# Patient Record
Sex: FEMALE | Race: WHITE
[De-identification: names, ages, dates, MRNs, and addresses within clinical notes are randomized per-mention and may not be internally consistent; named-entity substitution may affect disease eponyms.]

---

## 2018-01-21 ENCOUNTER — HOSPITAL ENCOUNTER (EMERGENCY)
Age: 23
LOS: 1 days | Discharge: HOME | End: 2018-01-22

## 2018-01-21 ENCOUNTER — HOSPITAL ENCOUNTER (EMERGENCY)
Dept: HOSPITAL 91 - FTE | Age: 23
LOS: 1 days | Discharge: HOME | End: 2018-01-22
Payer: COMMERCIAL

## 2018-01-21 DIAGNOSIS — K21.9: Primary | ICD-10-CM

## 2018-01-21 PROCEDURE — 71045 X-RAY EXAM CHEST 1 VIEW: CPT

## 2018-01-21 PROCEDURE — 93005 ELECTROCARDIOGRAM TRACING: CPT

## 2018-01-21 PROCEDURE — 70360 X-RAY EXAM OF NECK: CPT

## 2018-01-21 PROCEDURE — 99284 EMERGENCY DEPT VISIT MOD MDM: CPT

## 2018-01-22 RX ADMIN — ALUMINUM HYDROXIDE, MAGNESIUM HYDROXIDE, DIMETHICONE 1 ML: 200; 200; 20 SUSPENSION ORAL at 02:21

## 2018-02-22 ENCOUNTER — HOSPITAL ENCOUNTER (OUTPATIENT)
Dept: HOSPITAL 91 - GIL | Age: 23
Discharge: HOME | End: 2018-02-22
Payer: COMMERCIAL

## 2018-02-22 ENCOUNTER — HOSPITAL ENCOUNTER (OUTPATIENT)
Age: 23
Discharge: HOME | End: 2018-02-22

## 2018-02-22 DIAGNOSIS — E66.01: ICD-10-CM

## 2018-02-22 DIAGNOSIS — K29.60: ICD-10-CM

## 2018-02-22 DIAGNOSIS — K21.9: Primary | ICD-10-CM

## 2018-02-22 PROCEDURE — 87081 CULTURE SCREEN ONLY: CPT

## 2018-02-22 PROCEDURE — 84703 CHORIONIC GONADOTROPIN ASSAY: CPT

## 2018-02-22 PROCEDURE — 43239 EGD BIOPSY SINGLE/MULTIPLE: CPT

## 2018-11-23 ENCOUNTER — HOSPITAL ENCOUNTER (EMERGENCY)
Dept: HOSPITAL 91 - FTE | Age: 23
Discharge: HOME | End: 2018-11-23
Payer: SELF-PAY

## 2018-11-23 ENCOUNTER — HOSPITAL ENCOUNTER (EMERGENCY)
Age: 23
Discharge: HOME | End: 2018-11-23

## 2018-11-23 DIAGNOSIS — K29.70: Primary | ICD-10-CM

## 2018-11-23 PROCEDURE — 99283 EMERGENCY DEPT VISIT LOW MDM: CPT

## 2018-11-23 RX ADMIN — ALUMINUM HYDROXIDE, MAGNESIUM HYDROXIDE, DIMETHICONE 1 ML: 200; 200; 20 SUSPENSION ORAL at 19:31

## 2018-11-23 RX ADMIN — PHENOBARBITAL, HYOSCYAMINE SULFATE, ATROPINE SULFATE, SCOPOLAMINE HYDROBROMIDE 1 TAB: 16.2; .1037; .0194; .0065 TABLET ORAL at 19:31

## 2019-07-07 ENCOUNTER — HOSPITAL ENCOUNTER (OUTPATIENT)
Dept: HOSPITAL 10 - OBT | Age: 24
Discharge: HOME | End: 2019-07-07
Attending: SPECIALIST
Payer: COMMERCIAL

## 2019-07-07 ENCOUNTER — HOSPITAL ENCOUNTER (OUTPATIENT)
Dept: HOSPITAL 91 - OBT | Age: 24
Discharge: HOME | End: 2019-07-07
Payer: COMMERCIAL

## 2019-07-07 VITALS
WEIGHT: 210.54 LBS | HEIGHT: 62 IN | WEIGHT: 210.54 LBS | HEIGHT: 62 IN | BODY MASS INDEX: 38.74 KG/M2 | BODY MASS INDEX: 38.74 KG/M2

## 2019-07-07 VITALS — HEART RATE: 80 BPM | DIASTOLIC BLOOD PRESSURE: 54 MMHG | RESPIRATION RATE: 18 BRPM | SYSTOLIC BLOOD PRESSURE: 99 MMHG

## 2019-07-07 DIAGNOSIS — O26.892: Primary | ICD-10-CM

## 2019-07-07 DIAGNOSIS — R35.0: ICD-10-CM

## 2019-07-07 DIAGNOSIS — M54.9: ICD-10-CM

## 2019-07-07 DIAGNOSIS — Z3A.26: ICD-10-CM

## 2019-07-07 LAB
ADD UMIC: YES
UR ASCORBIC ACID: 40 MG/DL
UR BACTERIA: (no result) /HPF
UR BILIRUBIN (DIP): NEGATIVE MG/DL
UR BLOOD (DIP): NEGATIVE MG/DL
UR CLARITY: (no result)
UR COLOR: YELLOW
UR GLUCOSE (DIP): NEGATIVE MG/DL
UR KETONES (DIP): (no result) MG/DL
UR LEUKOCYTE ESTERASE (DIP): (no result) LEU/UL
UR MUCUS: (no result) /HPF
UR NITRITE (DIP): NEGATIVE MG/DL
UR PH (DIP): 7 (ref 5–9)
UR RBC: 4 /HPF (ref 0–5)
UR SPECIFIC GRAVITY (DIP): 1.02 (ref 1–1.03)
UR SQUAMOUS EPITHELIAL CELL: (no result) /HPF
UR TOTAL PROTEIN (DIP): NEGATIVE MG/DL
UR UROBILINOGEN (DIP): NEGATIVE MG/DL
UR WBC: 3 /HPF (ref 0–5)

## 2019-07-07 PROCEDURE — G0463 HOSPITAL OUTPT CLINIC VISIT: HCPCS

## 2019-07-07 PROCEDURE — 87086 URINE CULTURE/COLONY COUNT: CPT

## 2019-07-07 PROCEDURE — 81001 URINALYSIS AUTO W/SCOPE: CPT

## 2019-07-07 NOTE — TRIAGE
===================================

OB Triage

===================================

Datetime Report Generated by CPN: 07/07/2019 14:41

   

   

===========================

Datetime: 07/07/2019 14:25

===========================

   

 Stage of Pregnancy:  OB Triage

   

===========================

Datetime: 07/07/2019 12:50

===========================

   

 Assessment Type:  Triage

   

===================================

Maternal Assessment

===================================

   

 Level of Consciousness:  Keenly Alert, Responsive

 DTR's/Clonus:  DTRs 2+; No Clonus

 Headache:  Denies

 Blurred Vision:  No

 Respiratory Effort:  Unlabored; Regular Rhythm; Equal Expansion

 Breath Sounds, Left:  Clear and Equal

 Breath Sounds, Right:  Clear and Equal

 Nausea/Vomiting:  Denies

 RUQ Epigastric Pain:  Denies

 Lower Extremities Edema:  None

     Degree:  None

 Upper Extremities Edema:  None

     Degree:  None

 Facial Edema:  None

   

===================================

Fall Risk Assessment

===================================

   

 History of Falling:  (0) No

 Secondary Diagnosis:  (0) No

 Ambulatory Aid:  (0) Bedrest/Nurse Assist

 IV Therapy:  (0) No

 Gait:  (0) Normal/Bedrest/Immobile

 Mental Status:  (0) Oriented to Own Ability

 Fall Score:  0

 Fall Risk Score Definition:  No Risk: No action required

   

===========================

Datetime: 07/07/2019 12:48

===========================

   

   

===================================

Labor Evaluation

===================================

   

 Monitor Mode:  External

   

===================================

Fetal Heart Rate

===================================

   

 Monitor Mode:  External US

   

===========================

Datetime: 07/07/2019 12:43

===========================

   

 Time of Arrival:  07/07/2019 12:34

 EGA:  26.0

 Arrived By:  Ambulatory

 Arrived From:  Home

 Chief Complaint:  PT. HERE C/O URINARY FREAQUENCY AND BACK PAIN

 Fetal Movement:  Present

 Contractions:  Irregular

 Rupture of Membranes:  Denies

 Vaginal Bleeding:  None

 Vaginal Discharge:  Denies

 Recent Sexual Intercouse:  Denies

 Abdominal Trauma:  Not Applicable

 Patient Complaints:  Cramping; Back Pain

 Time Provider Notified:  07/07/2019 13:02

 Provider Notified:  MILESTONE

 Initial Plan:  NST/DWAYNE

## 2019-07-22 NOTE — PN
Date/Time of Note


Date/Time of Note


DATE: 7/22/19 


TIME: 18:41





OB Subjective


Subjective


Subjective


Triage date 7/7/2019


Gestational age of 26 weeks


Patient presents with complaints of urinary frequency and back pain


Urinary analysis.


Urine culture pending


NST appropriate for gestational age











MILESTONE,JUSTIN CHAIREZ             Jul 22, 2019 18:42

## 2019-07-25 ENCOUNTER — HOSPITAL ENCOUNTER (OUTPATIENT)
Dept: HOSPITAL 10 - OBT | Age: 24
Discharge: HOME | End: 2019-07-25
Attending: SPECIALIST
Payer: COMMERCIAL

## 2019-07-25 ENCOUNTER — HOSPITAL ENCOUNTER (OUTPATIENT)
Dept: HOSPITAL 91 - OBT | Age: 24
Discharge: HOME | End: 2019-07-25
Payer: COMMERCIAL

## 2019-07-25 VITALS
WEIGHT: 211.64 LBS | HEIGHT: 62 IN | BODY MASS INDEX: 38.95 KG/M2 | BODY MASS INDEX: 38.95 KG/M2 | WEIGHT: 211.64 LBS | HEIGHT: 62 IN

## 2019-07-25 VITALS — DIASTOLIC BLOOD PRESSURE: 55 MMHG | HEART RATE: 84 BPM | SYSTOLIC BLOOD PRESSURE: 112 MMHG | RESPIRATION RATE: 18 BRPM

## 2019-07-25 DIAGNOSIS — O41.92X0: Primary | ICD-10-CM

## 2019-07-25 DIAGNOSIS — Z3A.28: ICD-10-CM

## 2019-07-25 LAB
ADD UMIC: YES
RUPTURE FETAL MEMBRANES: NEGATIVE
UR ASCORBIC ACID: NEGATIVE MG/DL
UR BACTERIA: (no result) /HPF
UR BILIRUBIN (DIP): NEGATIVE MG/DL
UR BLOOD (DIP): NEGATIVE MG/DL
UR CLARITY: (no result)
UR COLOR: YELLOW
UR GLUCOSE (DIP): NEGATIVE MG/DL
UR KETONES (DIP): NEGATIVE MG/DL
UR LEUKOCYTE ESTERASE (DIP): (no result) LEU/UL
UR NITRITE (DIP): NEGATIVE MG/DL
UR PH (DIP): 7 (ref 5–9)
UR RBC: 0 /HPF (ref 0–5)
UR SPECIFIC GRAVITY (DIP): 1.01 (ref 1–1.03)
UR SQUAMOUS EPITHELIAL CELL: (no result) /HPF
UR TOTAL PROTEIN (DIP): NEGATIVE MG/DL
UR UROBILINOGEN (DIP): NEGATIVE MG/DL
UR WBC: 2 /HPF (ref 0–5)

## 2019-07-25 PROCEDURE — 76815 OB US LIMITED FETUS(S): CPT

## 2019-07-25 PROCEDURE — 81001 URINALYSIS AUTO W/SCOPE: CPT

## 2019-07-25 PROCEDURE — 84112 EVAL AMNIOTIC FLUID PROTEIN: CPT

## 2019-07-25 PROCEDURE — G0463 HOSPITAL OUTPT CLINIC VISIT: HCPCS

## 2019-07-25 NOTE — HP
Date/Time of Note


Date/Time of Note


DATE: 19 


TIME: 23:49





OB - History


Hx of Present Pregnancy


Free Text/Dictation


23-year-old  3 para 1 with pregnancy at 28 weeks and 4 days.  Estimated 


due date 2019.  She reports to OB triage area with chief complaint 


of loss of fluid per vagina.  She reports she noticed involuntary loss of urine 


last night.  She was convinced that the fluid is urine because it smelled like 


urine and it looks like urine.  She has a long history of pattern with UTI 


throughout this pregnancy.  She reports that she was placed on Macrobid for 1 


months earlier in the pregnancy.  She also complaining of frequency more than 


before since yesterday.  She reports today she had less loss of fluid per vagina


and she believes that all the loss of fluid per vagina or the urine.  She came 


to hospital to make sure she does not have premature rupture of membrane.  She 


denies vaginal bleeding.  She denies uterine contractions.  She reports good 


fetal movements.  She denies nausea vomiting fevers or chills.


On speculum examination pooling is negative, nitrazine is negative, ROM plus 


return negative.


Prenatal Care:  Good Care


Ultrasounds:  Normal mid trimester US


Obstetrical Complications:  None


Medical Complications:  None





Past Family/Social History


*


Past Medical, Surgical, Family and Obstetric Histories reviewed from prenatal 


chart.





OB  Admission Exam


Vital Signs


Vital Signs





Vital Signs


  Date      Temp  Pulse  Resp  B/P (MAP)   Pulse Ox  O2          O2 Flow    FiO2


Time                                                 Delivery    Rate


   19  98.8     84    18      112/55


     18:43                           (74)








Physical Exam


HEENT:  WNL


Heart:  Rhythm Normal


Lungs:  Clear, Equal


Abdomen:  WNL


Extremities:  Normal


Reflexes:  Normal


Cervical Dilatation:  None


Effacement:  0%


Station:  [place]


Membranes:  Intact





OB  Assessment/Plan


Other Assessment:


Pregnancy at 28 weeks and 4 days.


Urinary tract infection based on 2+ leukocytes on UA.


No evidence of premature  rupture of membrane based on the examination 


negative nitrazine and negative ROM plus.


Other plan:


For abundance of precaution patient is requested to return in 2 days for NST CHUCK


although the CHUCK is normal today.


Keflex 500 mg every 6 hours for 10 days #40











MAX ENNIS MD            2019 23:54

## 2019-07-26 NOTE — TRIAGE
===================================

OB Triage

===================================

Datetime Report Generated by CPN: 07/26/2019 04:54

   

   

===========================

Datetime: 07/25/2019 22:49

===========================

   

 Stage of Pregnancy:  OB Triage

   

===================================

Labor Evaluation

===================================

   

 Frequency:  Occasional

 Monitor Mode:  External

 Duration (sec)2399:  40

 Quality:  Mild

 Pattern:  Normal: <= 5 Contractions in 10 Minutes

 Resting Tone Flowe[place]:  Relaxed

   

===================================

Fetal Heart Rate

===================================

   

 FHR Baseline Rate:  135

 Monitor Mode:  External US

 FHR Baseline Changes:  No Baseline Change

 Variability:  Moderate 6-25 bpm

 Accelerations:  15X15

   

===========================

Datetime: 07/25/2019 22:44

===========================

   

 Stage of Pregnancy:  OB Triage

   

===========================

Datetime: 07/25/2019 22:00

===========================

   

 Stage of Pregnancy:  OB Triage

   

===================================

Labor Evaluation

===================================

   

 Frequency:  Occasional

 Monitor Mode:  External

 Duration (sec)2399:  50-60

 Quality:  Mild

 Pattern:  Normal: <= 5 Contractions in 10 Minutes

 Resting Tone Floweree:  Relaxed

   

===================================

Fetal Heart Rate

===================================

   

 FHR Baseline Rate:  135

 Monitor Mode:  External US

 Variability:  Moderate 6-25 bpm

 Accelerations:  15X15

   

===========================

Datetime: 07/25/2019 21:25

===========================

   

 Stage of Pregnancy:  OB Triage

   

===========================

Datetime: 07/25/2019 21:00

===========================

   

 Stage of Pregnancy:  OB Triage

   

===================================

Labor Evaluation

===================================

   

 Frequency:  Occasional

 Monitor Mode:  External

 Duration (sec)2399:  40-60

 Quality:  Mild

 Pattern:  Normal: <= 5 Contractions in 10 Minutes

 Resting Tone Floweree:  Relaxed

   

===================================

Fetal Heart Rate

===================================

   

 FHR Baseline Rate:  140

 Monitor Mode:  External US

 FHR Baseline Changes:  No Baseline Change

 Variability:  Moderate 6-25 bpm

 Accelerations:  15X15

   

===========================

Datetime: 07/25/2019 20:50

===========================

   

 Stage of Pregnancy:  OB Triage

   

===================================

Pain Assessment

===================================

   

 Pain Scale:  0

 Pain Presence:  None/Denies

 Pain Type:  N/A

   

===========================

Datetime: 07/25/2019 20:00

===========================

   

 Stage of Pregnancy:  OB Triage

   

===================================

Labor Evaluation

===================================

   

 Frequency:  Irritability x4

 Monitor Mode:  External

 Duration (sec)2399:  20-30

 Quality:  Mild

 Pattern:  Normal: <= 5 Contractions in 10 Minutes

 Resting Tone Floweree:  Relaxed

   

===================================

Fetal Heart Rate

===================================

   

 FHR Baseline Rate:  140

 Monitor Mode:  External US

 Variability:  Moderate 6-25 bpm

 Accelerations:  10X10

   

===========================

Datetime: 07/25/2019 19:48

===========================

   

 Stage of Pregnancy:  OB Triage

   

===========================

Datetime: 07/25/2019 19:21

===========================

   

 Stage of Pregnancy:  OB Triage

 Assessment Type:  Triage

   

===================================

Maternal Assessment

===================================

   

 Level of Consciousness:  Keenly Alert, Responsive

 DTR's/Clonus:  DTRs 2+; No Clonus

 Headache:  Denies

 Blurred Vision:  No

 Respiratory Effort:  Unlabored; Regular Rhythm; Equal Expansion

 Breath Sounds, Left:  Clear and Equal

 Breath Sounds, Right:  Clear and Equal

 Nausea/Vomiting:  Denies

 RUQ Epigastric Pain:  Denies

 Lower Extremities Edema:  None

     Degree:  None

 Upper Extremities Edema:  None

     Degree:  None

 Facial Edema:  None

 Temperature Route:  Oral

   

===================================

Fall Risk Assessment

===================================

   

 History of Falling:  (0) No

 Secondary Diagnosis:  (0) No

 Ambulatory Aid:  (0) Bedrest/Nurse Assist

 IV Therapy:  (0) No

 Gait:  (0) Normal/Bedrest/Immobile

 Mental Status:  (0) Oriented to Own Ability

 Fall Score:  0

 Fall Risk Score Definition:  No Risk: No action required

   

===================================

Pain Assessment

===================================

   

 Pain Scale:  0

 Pain Presence:  None/Denies

 Pain Type:  N/A

   

===========================

Datetime: 07/25/2019 18:53

===========================

   

 Stage of Pregnancy:  OB Triage

 Assessment Type:  Triage

   

===================================

Maternal Assessment

===================================

   

 Level of Consciousness:  Keenly Alert, Responsive

 DTR's/Clonus:  DTRs 2+; No Clonus

 Headache:  Denies

 Blurred Vision:  No

 Respiratory Effort:  Unlabored; Regular Rhythm; Equal Expansion

 Breath Sounds, Left:  Clear and Equal

 Breath Sounds, Right:  Clear and Equal

 Nausea/Vomiting:  Denies

 RUQ Epigastric Pain:  Denies

 Lower Extremities Edema:  None

     Degree:  None

 Upper Extremities Edema:  None

     Degree:  None

 Facial Edema:  None

 Temperature Route:  Oral

   

===================================

Fall Risk Assessment

===================================

   

 History of Falling:  (0) No

 Secondary Diagnosis:  (0) No

 Ambulatory Aid:  (0) Bedrest/Nurse Assist

 IV Therapy:  (0) No

 Gait:  (0) Normal/Bedrest/Immobile

 Mental Status:  (0) Oriented to Own Ability

 Fall Score:  0

 Fall Risk Score Definition:  No Risk: No action required

   

===================================

Labor Evaluation

===================================

   

 Frequency:  x3 

 Monitor Mode:  External

 Duration (sec)2399:  40

 Quality:  Mild

 Resting Tone Floweree:  Relaxed

   

===================================

Fetal Heart Rate

===================================

   

 FHR Baseline Rate:  150

 Monitor Mode:  External US

 FHR Baseline Changes:  No Baseline Change

 Variability:  Moderate 6-25 bpm

 Decelerations:  None

 Category:  Category I

   

===================================

Pain Assessment

===================================

   

 Pain Scale:  0

 Pain Presence:  None/Denies

 Pain Type:  N/A

   

===========================

Datetime: 07/25/2019 18:51

===========================

   

 Time of Arrival:  07/25/2019 17:54

 EGA:  28.4

 Arrived By:  Ambulatory

 Arrived From:  Home

 Chief Complaint:  leaking from 07/24 2000

 Fetal Movement:  Present

 Contractions:  Denies/Absent

 Rupture of Membranes:  Unsure

 Vaginal Bleeding:  None

 Vaginal Discharge:  Denies

 Recent Sexual Intercouse:  Denies

 Abdominal Trauma:  Not Applicable

 Patient Complaints:  None

 Time Provider Notified:  07/25/2019 19:12

 Provider Notified:  Dr Carmen

 Initial Plan:  NST

## 2019-07-27 ENCOUNTER — HOSPITAL ENCOUNTER (OUTPATIENT)
Dept: HOSPITAL 10 - OBT | Age: 24
Discharge: HOME | End: 2019-07-27
Attending: SPECIALIST
Payer: COMMERCIAL

## 2019-07-27 ENCOUNTER — HOSPITAL ENCOUNTER (OUTPATIENT)
Dept: HOSPITAL 91 - OBT | Age: 24
Discharge: HOME | End: 2019-07-27
Payer: COMMERCIAL

## 2019-07-27 VITALS
WEIGHT: 211.86 LBS | WEIGHT: 211.86 LBS | HEIGHT: 62 IN | BODY MASS INDEX: 38.99 KG/M2 | BODY MASS INDEX: 38.99 KG/M2 | HEIGHT: 62 IN

## 2019-07-27 VITALS — RESPIRATION RATE: 18 BRPM | SYSTOLIC BLOOD PRESSURE: 98 MMHG | DIASTOLIC BLOOD PRESSURE: 55 MMHG | HEART RATE: 81 BPM

## 2019-07-27 DIAGNOSIS — Z3A.28: ICD-10-CM

## 2019-07-27 DIAGNOSIS — O41.92X0: Primary | ICD-10-CM

## 2019-07-27 LAB
ADD UMIC: YES
RUPTURE FETAL MEMBRANES: NEGATIVE
UR ASCORBIC ACID: 40 MG/DL
UR BACTERIA: (no result) /HPF
UR BILIRUBIN (DIP): NEGATIVE MG/DL
UR BLOOD (DIP): NEGATIVE MG/DL
UR CLARITY: (no result)
UR COLOR: YELLOW
UR GLUCOSE (DIP): NEGATIVE MG/DL
UR KETONES (DIP): (no result) MG/DL
UR LEUKOCYTE ESTERASE (DIP): (no result) LEU/UL
UR NITRITE (DIP): NEGATIVE MG/DL
UR PH (DIP): 6 (ref 5–9)
UR RBC: 2 /HPF (ref 0–5)
UR SPECIFIC GRAVITY (DIP): 1.02 (ref 1–1.03)
UR SQUAMOUS EPITHELIAL CELL: (no result) /HPF
UR TOTAL PROTEIN (DIP): NEGATIVE MG/DL
UR UROBILINOGEN (DIP): (no result) MG/DL
UR WBC: 2 /HPF (ref 0–5)

## 2019-07-27 PROCEDURE — 84112 EVAL AMNIOTIC FLUID PROTEIN: CPT

## 2019-07-27 PROCEDURE — 76818 FETAL BIOPHYS PROFILE W/NST: CPT

## 2019-07-27 PROCEDURE — 81001 URINALYSIS AUTO W/SCOPE: CPT

## 2019-07-27 PROCEDURE — G0463 HOSPITAL OUTPT CLINIC VISIT: HCPCS

## 2019-07-27 NOTE — TRIAGE
===================================

OB Triage

===================================

Datetime Report Generated by CPN: 07/27/2019 14:56

   

   

===========================

Datetime: 07/27/2019 14:00

===========================

   

 Stage of Pregnancy:  OB Triage

 Level of Consciousness:  Keenly Alert, Responsive

 Frequency:  OCCASIONAL

 Monitor Mode:  External

 Duration (sec)2399:  60-70

 Quality:  Mild

 Resting Tone [place]:  Relaxed

 FHR Baseline Rate:  140

 Monitor Mode:  External US

 Variability:  Moderate 6-25 bpm

 Accelerations:  15X15

 Decelerations:  None

 Category:  Category I

 Pain Scale:  0

 Pain Goal:  3

 Membrane Status:  Intact

 Vaginal Bleeding:  None

   

===========================

Datetime: 07/27/2019 13:25

===========================

   

 Pool:  Negative

   

===========================

Datetime: 07/27/2019 13:21

===========================

   

 Assessment Type:  Triage

 Level of Consciousness:  Keenly Alert, Responsive

 DTR's/Clonus:  DTRs 2+; No Clonus

 Headache:  Denies

 Blurred Vision:  No

 Respiratory Effort:  Unlabored; Regular Rhythm; Equal Expansion

 Breath Sounds, Left:  Clear and Equal

 Breath Sounds, Right:  Clear and Equal

 Nausea/Vomiting:  Denies

 RUQ Epigastric Pain:  Denies

 Lower Extremities Edema:  None

     Degree:  None

 Upper Extremities Edema:  None

     Degree:  None

 Facial Edema:  None

 History of Falling:  (0) No

 Secondary Diagnosis:  (0) No

 Ambulatory Aid:  (0) Bedrest/Nurse Assist

 IV Therapy:  (0) No

 Gait:  (0) Normal/Bedrest/Immobile

 Mental Status:  (0) Oriented to Own Ability

 Fall Score:  0

 Fall Risk Score Definition:  No Risk: No action required

   

===========================

Datetime: 07/27/2019 13:19

===========================

   

 Time of Arrival:  07/27/2019 12:24

 EGA:  28.6

 Arrived By:  Ambulatory

 Arrived From:  Home

 Chief Complaint:  PT. HERE FOR NST/BPP FOR H/O ROM PLUS POSITVE.

 Fetal Movement:  Present

 Contractions:  Denies/Absent

 Rupture of Membranes:  Unsure

 Vaginal Bleeding:  None

 Vaginal Discharge:  Present

 Recent Sexual Intercouse:  Denies

 Abdominal Trauma:  Not Applicable

 Patient Complaints:  None

 Time Provider Notified:  07/27/2019 13:05

 Provider Notified:  FAZILAT

 Initial Plan:  NST/BPP/ROM PLUS/UA

   

===========================

Datetime: 07/27/2019 13:10

===========================

   

 Monitor Mode:  External

 Monitor Mode:  External US

   

===========================

Datetime: 07/25/2019 22:49

===========================

   

 Pain Scale:  0

 Pain Presence:  None/Denies

 Pain Type:  N/A

 Pain Assessment Comments:  Pt continues to deny pain or cramping

   

===========================

Datetime: 07/25/2019 22:48

===========================

   

 Stage of Pregnancy:  OB Triage

   

===========================

Datetime: 07/25/2019 22:31

===========================

   

 Stage of Pregnancy:  OB Triage

   

===========================

Datetime: 07/25/2019 22:29

===========================

   

 Stage of Pregnancy:  OB Triage

   

===========================

Datetime: 07/25/2019 21:41

===========================

   

 Stage of Pregnancy:  OB Triage

   

===========================

Datetime: 07/25/2019 20:50

===========================

   

 Pain Assessment Comments:  Pt continues to deny any pain or cramping at this time

   

===========================

Datetime: 07/25/2019 20:09

===========================

   

 Stage of Pregnancy:  OB Triage

   

===========================

Datetime: 07/25/2019 19:50

===========================

   

 Stage of Pregnancy:  OB Triage

## 2019-07-29 NOTE — PN
Triage Information


Date/Time


late entry note for 2019


Reason for visit:  NST and BPP


Weeks of Gestation


 at 28+ wks ga KALPANA 10/7/2019 here for NST and BPP for hx of questionable  


+ROM





patient reports positive fetal movement, denies uterine contractions, denies 


vaginal bleeding or leaking fluid








patient is currently on keflex for UTI





/Para





Diabetes:  none


Hypertention:  none





Objective





Vital Signs


  Date      Temp  Pulse  Resp  B/P (MAP)   Pulse Ox  O2          O2 Flow    FiO2


Time                                                 Delivery    Rate


   19  98.2     81    18  98/55 (69)            Room Air


     13:16





Fetal Heart Rate:  140's


Fetal Heart Rate Comments


fhr tracing cat 1


Contractions:  None





Results/Medications


Results 24 hrs


ROM negative 











Urine Results - 72 Hrs


          Test
                                         19
13:30


          Urine Color
                     YELLOW
(YELLOW)


          Urine Clarity
                   SLIGHTLY
CLOUDY (CLEAR)


          Urine pH                                    6.0 (5.0-9.0)


          Urine Specific Gravity
                 1.019
(1.003-1.030)


          Urine Ketones
                       1+ mg/dL
(NEGATIVE)  H


          Urine Nitrite
                   NEGATIVE mg/dL
(NEGATIVE)


          Urine Bilirubin
                 NEGATIVE mg/dL
(NEGATIVE)


          Urine Urobilinogen
                  1+ mg/dL
(NEGATIVE)  H


          Urine Leukocyte Esterase
        TRACE [place]/ul
(NEGATIVE)  A


          Urine Microscopic RBC                        2 /HPF (0-5)


          Urine Microscopic WBC                        2 /HPF (0-5)


          Urine Squamous Epithelial
Cells  FEW /HPF (FEW)



          Urine Bacteria
                  FEW /HPF (NONE
SEEN)  A


          Urine Hemoglobin
                NEGATIVE mg/dL
(NEGATIVE)


          Urine Glucose
                   NEGATIVE mg/dL
(NEGATIVE)


          Urine Total Protein
             NEGATIVE mg/dl
(NEGATIVE)





Imaging Results


                                        


PROCEDURE:   OB ultrasound for biophysical profile. 


 


CLINICAL INDICATION:   Low amniotic fluid index. 


 


TECHNIQUE:   Multiple sonographic images of the pelvis were obtained using 


transabdominal approach.  


 


COMPARISON: Obstetrical sonogram dated 2019.


 


FINDINGS:


 


Fetal breathing movement = 2/2


Fetal tone = 2/2


Fetal motion = 2/2


 


CHUCK = 16.4 cm


Single living intrauterine gestation with fetal heart rate of 137 beats per 


minute.  


Anterior grade II placenta.


Cephalic presentation.


 


IMPRESSION:


 


1.  Single living intrauterine gestation with cephalic presentation.  


2.  Biophysical profile = 8/8.


3.  CHUCK = 16.4 cm. 


 


 


 


 


 


 


 


 


RPTAT:HAJM


_____________________________________________ 


Zachariah Lucero Physician           Date    Time 


Electronically viewed and signed by Zachariah Lucero Physician on 2019 


14:33 


 


D:  2019 14:33  T:  2019 14:33


RM/





CC: MAX ENNIS MD





[phone]


Disposition:  Discharge


Assessment/Plan


fetal kick count instructions were given


labor precautions were given


patient instructed to f/u with obgyn clinic in 1-2 days











PRINCESS ARTEAGA MD             2019 10:14

## 2019-08-07 ENCOUNTER — HOSPITAL ENCOUNTER (OUTPATIENT)
Dept: HOSPITAL 10 - OBT | Age: 24
Discharge: HOME | End: 2019-08-07
Attending: SPECIALIST
Payer: COMMERCIAL

## 2019-08-07 ENCOUNTER — HOSPITAL ENCOUNTER (OUTPATIENT)
Dept: HOSPITAL 91 - OBT | Age: 24
Discharge: HOME | End: 2019-08-07
Payer: COMMERCIAL

## 2019-08-07 VITALS
BODY MASS INDEX: 39.27 KG/M2 | HEIGHT: 62 IN | WEIGHT: 213.41 LBS | WEIGHT: 213.41 LBS | BODY MASS INDEX: 39.27 KG/M2 | HEIGHT: 62 IN

## 2019-08-07 VITALS — HEART RATE: 62 BPM

## 2019-08-07 DIAGNOSIS — O60.03: Primary | ICD-10-CM

## 2019-08-07 DIAGNOSIS — Z3A.31: ICD-10-CM

## 2019-08-07 LAB
ADD UMIC: NO
UR ASCORBIC ACID: NEGATIVE MG/DL
UR BILIRUBIN (DIP): NEGATIVE MG/DL
UR BLOOD (DIP): NEGATIVE MG/DL
UR CLARITY: CLEAR
UR COLOR: (no result)
UR GLUCOSE (DIP): NEGATIVE MG/DL
UR KETONES (DIP): NEGATIVE MG/DL
UR LEUKOCYTE ESTERASE (DIP): NEGATIVE LEU/UL
UR NITRITE (DIP): NEGATIVE MG/DL
UR PH (DIP): 8 (ref 5–9)
UR SPECIFIC GRAVITY (DIP): 1 (ref 1–1.03)
UR TOTAL PROTEIN (DIP): NEGATIVE MG/DL
UR UROBILINOGEN (DIP): NEGATIVE MG/DL

## 2019-08-07 PROCEDURE — 76818 FETAL BIOPHYS PROFILE W/NST: CPT

## 2019-08-07 PROCEDURE — 81003 URINALYSIS AUTO W/O SCOPE: CPT

## 2019-08-07 PROCEDURE — 76817 TRANSVAGINAL US OBSTETRIC: CPT

## 2019-08-07 PROCEDURE — 76815 OB US LIMITED FETUS(S): CPT

## 2019-08-07 PROCEDURE — G0463 HOSPITAL OUTPT CLINIC VISIT: HCPCS

## 2019-08-07 NOTE — TRIAGE
===================================

OB Triage

===================================

Datetime Report Generated by CPN: 08/07/2019 23:10

   

   

===========================

Datetime: 08/07/2019 19:35

===========================

   

 Assessment Type:  Triage

   

===================================

Maternal Assessment

===================================

   

 Level of Consciousness:  Keenly Alert, Responsive

 DTR's/Clonus:  DTRs 2+; No Clonus

 Headache:  Denies

 Blurred Vision:  No

 Respiratory Effort:  Unlabored; Regular Rhythm; Equal Expansion

 Breath Sounds, Left:  Clear and Equal

 Breath Sounds, Right:  Clear and Equal

 Nausea/Vomiting:  Denies

 RUQ Epigastric Pain:  Denies

 Facial Edema:  None

   

===================================

Fall Risk Assessment

===================================

   

 History of Falling:  (0) No

 Secondary Diagnosis:  (0) No

 Ambulatory Aid:  (0) Bedrest/Nurse Assist

 IV Therapy:  (0) No

 Gait:  (0) Normal/Bedrest/Immobile

 Mental Status:  (0) Oriented to Own Ability

 Fall Score:  0

 Fall Risk Score Definition:  No Risk: No action required

   

===========================

Datetime: 08/07/2019 18:42

===========================

   

   

===================================

Labor Evaluation

===================================

   

 Frequency:  2 - 3, irregular

 Monitor Mode:  External

 Quality:  Mild

 Resting Tone [place]:  Relaxed

   

===================================

Fetal Heart Rate

===================================

   

 FHR Baseline Rate:  130

 Monitor Mode:  External US

 Variability:  Moderate 6-25 bpm

 Accelerations:  15X15

 Category:  Category I

   

===========================

Datetime: 08/07/2019 17:03

===========================

   

   

===================================

Vaginal Exam

===================================

   

 Dilatation (cms):  0.0

 Station:  -3

 Exam By:  S. Chait

 Vaginal Bleeding:  None

 Cervix, Consistency:  Moderate

 Cervix, Position:  Posterior

 Fetal Presentation 'A':  Unable to Assess

   

===========================

Datetime: 08/07/2019 16:05

===========================

   

   

===================================

Labor Evaluation

===================================

   

 Frequency:  3-5

 Monitor Mode:  External

 Duration (sec)2399:  50

 Quality:  Mild

 Resting Tone [place]:  Relaxed

   

===================================

Fetal Heart Rate

===================================

   

 FHR Baseline Rate:  135

 Monitor Mode:  External US

 Accelerations:  15X15

 Decelerations:  None

 Category:  Category I

   

===========================

Datetime: 08/07/2019 15:46

===========================

   

 Assessment Type:  Triage

   

===================================

Maternal Assessment

===================================

   

 Level of Consciousness:  Keenly Alert, Responsive

 DTR's/Clonus:  DTRs 2+; No Clonus

 Headache:  Denies

 Blurred Vision:  No

 Respiratory Effort:  Unlabored; Regular Rhythm; Equal Expansion

 Breath Sounds, Left:  Clear and Equal

 Breath Sounds, Right:  Clear and Equal

 Nausea/Vomiting:  Denies

 RUQ Epigastric Pain:  Denies

 Lower Extremities Edema:  None

     Degree:  None

 Upper Extremities Edema:  None

     Degree:  None

 Facial Edema:  None

   

===================================

Fall Risk Assessment

===================================

   

 History of Falling:  (0) No

 Secondary Diagnosis:  (0) No

 Ambulatory Aid:  (0) Bedrest/Nurse Assist

 IV Therapy:  (0) No

 Gait:  (0) Normal/Bedrest/Immobile

 Mental Status:  (0) Oriented to Own Ability

 Fall Score:  0

 Fall Risk Score Definition:  No Risk: No action required

   

===========================

Datetime: 08/07/2019 15:43

===========================

   

 Time of Arrival:  08/07/2019 14:30

 EGA:  31.2

 Arrived By:  Ambulatory

 Arrived From:  Home

 Chief Complaint:  abd tightenning with low back pain, and occasional sharp upper abd pain, bilateral
.

 Fetal Movement:  Present

 Contractions:  Irregular

 Rupture of Membranes:  Denies

 Vaginal Bleeding:  None

 Vaginal Discharge:  Denies

 Recent Sexual Intercouse:  Denies

 Abdominal Trauma:  Not Applicable

 Patient Complaints:  Contractions

   

===========================

Datetime: 07/27/2019 13:21

===========================

   

 Fall Score:  0

 Fall Risk Score Definition:  No Risk: No action required

   

===========================

Datetime: 07/27/2019 13:19

===========================

   

 EGA:  29.5

   

===========================

Datetime: 07/25/2019 19:21

===========================

   

 Fall Score:  0

 Fall Risk Score Definition:  No Risk: No action required

   

===========================

Datetime: 07/25/2019 18:53

===========================

   

 Fall Score:  0

 Fall Risk Score Definition:  No Risk: No action required

   

===========================

Datetime: 07/25/2019 18:51

===========================

   

 EGA:  29.3

## 2019-08-07 NOTE — HP
Date/Time of Note


Date/Time of Note


DATE: 19 


TIME: 20:30





OB - History


Hx of Present Pregnancy


Free Text/Dictation


23-year-old  3 para 1 with pregnancy at 31 weeks and 2 days with due date


2019 who presented to OB triage area complaining of premature cramps


and contractions.  After oral hydration and resting she feels much better and 


her contractions resolved.  Cervical length was 3.2 cm.  On examination cervix 


is long closed posterior.  She denies vaginal bleeding loss of fluid per vagina.


 She denies nausea vomiting fevers or chills.  She reports good fetal movements.


She had a previous  around 36 weeks of pregnancy.


Prenatal Care:  [facility]


Ultrasounds:  Normal mid trimester US


Obstetrical Complications:  None


Medical Complications:  None





Past Family/Social History


*


Past Medical, Surgical, Family and Obstetric Histories reviewed from prenatal 


chart.





OB  Admission Exam


Vital Signs


Vital Signs





Vital Signs


  Date      Temp  Pulse  Resp  B/P (MAP)  Pulse Ox  O2          O2 Flow     FiO2


Time                                                Delivery    Rate


    19  98.1     62


     15:38








Physical Exam


HEENT:  WNL


Heart:  Rhythm Normal


Lungs:  Clear, Equal


Abdomen:  WNL


Extremities:  Normal


Reflexes:  Normal


Cervical Dilatation:  None


Effacement:  0%





OB  Assessment/Plan


Other Assessment:


Pregnancy at 31 weeks and 2 days


 labor resolved


History of previous  delivery


Other plan:


Patient will be discharged home with  labor precautions and pelvic rest











MAX ENNIS MD             Aug 7, 2019 20:32

## 2019-09-02 ENCOUNTER — HOSPITAL ENCOUNTER (OUTPATIENT)
Dept: HOSPITAL 10 - OBT | Age: 24
LOS: 1 days | Discharge: HOME | End: 2019-09-03
Attending: SPECIALIST
Payer: COMMERCIAL

## 2019-09-02 ENCOUNTER — HOSPITAL ENCOUNTER (OUTPATIENT)
Dept: HOSPITAL 91 - OBT | Age: 24
LOS: 1 days | Discharge: HOME | End: 2019-09-03
Payer: COMMERCIAL

## 2019-09-02 VITALS
BODY MASS INDEX: 41.66 KG/M2 | HEIGHT: 61 IN | BODY MASS INDEX: 41.66 KG/M2 | WEIGHT: 220.68 LBS | WEIGHT: 220.68 LBS | HEIGHT: 61 IN

## 2019-09-02 VITALS — RESPIRATION RATE: 18 BRPM | DIASTOLIC BLOOD PRESSURE: 58 MMHG | SYSTOLIC BLOOD PRESSURE: 103 MMHG | HEART RATE: 69 BPM

## 2019-09-02 DIAGNOSIS — R51: ICD-10-CM

## 2019-09-02 DIAGNOSIS — Z3A.35: ICD-10-CM

## 2019-09-02 DIAGNOSIS — H53.8: ICD-10-CM

## 2019-09-02 DIAGNOSIS — O26.893: Primary | ICD-10-CM

## 2019-09-02 PROCEDURE — G0463 HOSPITAL OUTPT CLINIC VISIT: HCPCS

## 2019-09-13 ENCOUNTER — HOSPITAL ENCOUNTER (OUTPATIENT)
Dept: HOSPITAL 91 - OBT | Age: 24
LOS: 1 days | Discharge: HOME | End: 2019-09-14
Payer: COMMERCIAL

## 2019-09-13 ENCOUNTER — HOSPITAL ENCOUNTER (OUTPATIENT)
Dept: HOSPITAL 10 - OBT | Age: 24
LOS: 1 days | Discharge: HOME | End: 2019-09-14
Attending: SPECIALIST
Payer: COMMERCIAL

## 2019-09-13 VITALS — HEART RATE: 73 BPM | DIASTOLIC BLOOD PRESSURE: 61 MMHG | RESPIRATION RATE: 18 BRPM | SYSTOLIC BLOOD PRESSURE: 109 MMHG

## 2019-09-13 VITALS
BODY MASS INDEX: 40.41 KG/M2 | WEIGHT: 219.58 LBS | HEIGHT: 62 IN | BODY MASS INDEX: 40.41 KG/M2 | WEIGHT: 219.58 LBS | HEIGHT: 62 IN

## 2019-09-13 DIAGNOSIS — Z3A.36: ICD-10-CM

## 2019-09-13 PROCEDURE — G0463 HOSPITAL OUTPT CLINIC VISIT: HCPCS

## 2019-09-13 PROCEDURE — 76818 FETAL BIOPHYS PROFILE W/NST: CPT

## 2019-09-13 PROCEDURE — 96361 HYDRATE IV INFUSION ADD-ON: CPT

## 2019-09-13 PROCEDURE — 96360 HYDRATION IV INFUSION INIT: CPT

## 2019-09-13 RX ADMIN — TERBUTALINE SULFATE 1 MG: 1 INJECTION SUBCUTANEOUS at 23:01

## 2019-09-13 RX ADMIN — PYRIDOXINE HYDROCHLORIDE 1 MLS/HR: 100 INJECTION, SOLUTION INTRAMUSCULAR; INTRAVENOUS at 19:54

## 2019-09-20 ENCOUNTER — HOSPITAL ENCOUNTER (OUTPATIENT)
Dept: HOSPITAL 10 - L-D | Age: 24
Discharge: HOME | End: 2019-09-20
Attending: SPECIALIST
Payer: COMMERCIAL

## 2019-09-20 ENCOUNTER — HOSPITAL ENCOUNTER (OUTPATIENT)
Dept: HOSPITAL 91 - OBT | Age: 24
Discharge: HOME | End: 2019-09-20
Payer: COMMERCIAL

## 2019-09-20 DIAGNOSIS — Z3A.37: ICD-10-CM

## 2019-09-20 DIAGNOSIS — O26.893: Primary | ICD-10-CM

## 2019-09-20 DIAGNOSIS — M79.651: ICD-10-CM

## 2019-09-20 PROCEDURE — 93971 EXTREMITY STUDY: CPT

## 2019-09-20 PROCEDURE — G0463 HOSPITAL OUTPT CLINIC VISIT: HCPCS

## 2019-10-30 ENCOUNTER — HOSPITAL ENCOUNTER (EMERGENCY)
Dept: HOSPITAL 10 - FTE | Age: 24
Discharge: HOME | End: 2019-10-30
Payer: COMMERCIAL

## 2019-10-30 VITALS
HEIGHT: 62 IN | HEIGHT: 62 IN | BODY MASS INDEX: 36.88 KG/M2 | WEIGHT: 200.4 LBS | WEIGHT: 200.4 LBS | BODY MASS INDEX: 36.88 KG/M2

## 2019-10-30 VITALS — HEART RATE: 57 BPM | DIASTOLIC BLOOD PRESSURE: 67 MMHG | SYSTOLIC BLOOD PRESSURE: 104 MMHG | RESPIRATION RATE: 20 BRPM

## 2019-10-30 DIAGNOSIS — R10.2: Primary | ICD-10-CM

## 2019-10-30 PROCEDURE — 87086 URINE CULTURE/COLONY COUNT: CPT

## 2019-10-30 PROCEDURE — 80053 COMPREHEN METABOLIC PANEL: CPT

## 2019-10-30 PROCEDURE — 81001 URINALYSIS AUTO W/SCOPE: CPT

## 2019-10-30 PROCEDURE — 96360 HYDRATION IV INFUSION INIT: CPT

## 2019-10-30 PROCEDURE — 85025 COMPLETE CBC W/AUTO DIFF WBC: CPT

## 2019-10-30 PROCEDURE — 74177 CT ABD & PELVIS W/CONTRAST: CPT

## 2019-10-30 PROCEDURE — 36415 COLL VENOUS BLD VENIPUNCTURE: CPT

## 2019-10-30 PROCEDURE — 81025 URINE PREGNANCY TEST: CPT

## 2019-10-30 PROCEDURE — 76830 TRANSVAGINAL US NON-OB: CPT

## 2019-10-30 PROCEDURE — 76856 US EXAM PELVIC COMPLETE: CPT
